# Patient Record
Sex: MALE | Race: NATIVE HAWAIIAN OR OTHER PACIFIC ISLANDER
[De-identification: names, ages, dates, MRNs, and addresses within clinical notes are randomized per-mention and may not be internally consistent; named-entity substitution may affect disease eponyms.]

---

## 2018-05-24 ENCOUNTER — HOSPITAL ENCOUNTER (INPATIENT)
Dept: HOSPITAL 80 - FED | Age: 33
LOS: 12 days | Discharge: HOME | DRG: 885 | End: 2018-06-05
Attending: PSYCHIATRY & NEUROLOGY | Admitting: PSYCHIATRY & NEUROLOGY
Payer: COMMERCIAL

## 2018-05-24 DIAGNOSIS — Z72.89: ICD-10-CM

## 2018-05-24 DIAGNOSIS — F17.220: ICD-10-CM

## 2018-05-24 DIAGNOSIS — S40.212A: ICD-10-CM
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
## 2018-05-24 DIAGNOSIS — S63.92XA: ICD-10-CM

## 2018-05-24 DIAGNOSIS — S00.81XA: ICD-10-CM

## 2018-05-24 DIAGNOSIS — F31.2: Primary | ICD-10-CM

## 2018-05-24 DIAGNOSIS — F12.20: ICD-10-CM

## 2018-05-24 LAB — PLATELET # BLD: 292 10^3/UL (ref 150–400)

## 2018-05-24 PROCEDURE — G0480 DRUG TEST DEF 1-7 CLASSES: HCPCS
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
## 2018-05-25 RX ADMIN — OLANZAPINE SCH MG: 10 TABLET, ORALLY DISINTEGRATING ORAL at 21:10

## 2018-05-25 NOTE — WOCRNPDOC
[facility]AR[facility] Advanced Assessment Note





- Skin Integrity Problem, Advanced Assess


  ** Left Shoulder Abrasion


Dressing Type: ABD Pad, Adaptic Touch, Other


Other Dressing Type: medipore tape


Dressing Description: Clean/Dry, Intact


Integumentary Issue Intervention: Visualized Under Dressing


Anusha Wound Tissue: Blanching


Wound Bed Color: Pink, Red


Wound Bed Constitution: Red/Pink - Non Granular Tissue


Site Measurement - Head-to-Toe Length X Width X Depth (cm): 5.5x7.2x0.1


Skin Integrity Problem Comment: Partial thickness wound. Wound bed is healthy 

pink and red. Hedii AVALOS provided education and reassurance about 

healing of wound. Patient was receptive to wound care suggestions to keep wound 

bed moist and covered to prevent scarring.





  ** Left Cheek Abrasion


Dressing Type: Open to Air


Exudate Amount: Scant


Exudate Color: Red


Exudate Characteristic(s): Dried, Serosanguinous


Anusha Wound Tissue: Blanching, Ecchymotic


Wound Bed Color: Purple, Red


Site Measurement - Head-to-Toe Length X Width X Depth (cm): 3.7x2.6x0.1


Skin Integrity Problem Comment: Partial thickness wounds to left cheek, the 

largest on cheekbone measures approximately 4x2.5. Spoke with CASSI Truong to 

have MD order bacitracin for facial wounds. Heidi AVALOS provided 

education about using bacitracin ointment to prevent scarring, and to use 

sunscreen to wound areas for 6 months to a year to prevent scarring from sun 

exposure. Wound care will sign off on this patient.

## 2018-05-26 RX ADMIN — OLANZAPINE SCH MG: 10 TABLET, ORALLY DISINTEGRATING ORAL at 20:42

## 2018-05-26 RX ADMIN — ACETAMINOPHEN PRN MG: 325 TABLET ORAL at 23:03
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
## 2018-05-27 RX ADMIN — ACETAMINOPHEN PRN MG: 325 TABLET ORAL at 11:58

## 2018-05-27 RX ADMIN — OLANZAPINE SCH MG: 10 TABLET, ORALLY DISINTEGRATING ORAL at 21:11

## 2018-05-27 RX ADMIN — ACETAMINOPHEN PRN MG: 325 TABLET ORAL at 21:11
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
## 2018-05-28 RX ADMIN — OLANZAPINE SCH MG: 10 TABLET, ORALLY DISINTEGRATING ORAL at 21:24
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
## 2018-05-29 RX ADMIN — DIVALPROEX SODIUM SCH MG: 500 TABLET, EXTENDED RELEASE ORAL at 20:42

## 2018-05-29 RX ADMIN — OLANZAPINE SCH MG: 10 TABLET, ORALLY DISINTEGRATING ORAL at 20:42

## 2018-05-29 NOTE — SOAPPROG
SOAP Progress Note


Assessment/Plan: 


Assessment:


























Plan:





05/29/18 18:47


Bipolar D/o:  Remains manic though improving.  Will continue Zyprexa and add 

VPA per Dr. Ahumada's plan.  I discussed with pt the possibility of longer term 

management with one agent, but needing both for acute stabilization.  He is 

accepting of this.  Will start VPA at 1000mg, monitor. 


Subjective: 





Pt seen, discussed with staff, Dr. Ahumada and pt's wife Debora.  Chart reviewed.  

He is pleasant and interactive during interview, though pressured and 

tangential at times.  Staff notes continued grandiosity, talking about his 

accomplishments at work and his very high achievement.  He minimizes 

significance of legal issues, stating he believes it will all be resolved.  

Wife sees this lack of insight at "being a con man."  She states this is not 

normal for him.  He is compliant with meds and therapies and agrees to start 

VPA tonight.  The risks, benefits and alternatives of this are reviewed with 

him.  He asks about possible "organ damage" and I discussed the potential risks 

of elevated transaminases and pancreatitis.  


Objective: 





 Vital Signs











Temp Pulse Resp BP Pulse Ox


 


 36.4 C   76   15   121/72 H  95 


 


 05/29/18 06:00  05/29/18 06:00  05/29/18 06:00  05/29/18 06:00  05/29/18 06:00








MSE:  Adequately groomed, animated, interactive.  Speech is pressured.  Affect 

is elevated, expansive.  Mood is "great."  TP is tangential.  TC reveals 

grandiose thoughts, poor reality testing.  Denies SI/HI/VI.





- Time Spent With Patient


Time Spent With Patient: 





25"





ICD10 Worksheet


Patient Problems: 


 Problems











Problem Status Onset


 


Abrasion of left shoulder, initial encounter Acute  


 


Bipolar affective, manic, severe w/ psych Acute  


 


Sprain of left hand Acute

## 2018-05-30 RX ADMIN — OLANZAPINE SCH MG: 10 TABLET, ORALLY DISINTEGRATING ORAL at 21:10

## 2018-05-30 RX ADMIN — DIVALPROEX SODIUM SCH MG: 500 TABLET, EXTENDED RELEASE ORAL at 21:10

## 2018-05-30 NOTE — SOAPPROG
SOAP Progress Note


Assessment/Plan: 


Assessment:


























Plan:





05/29/18 18:47


Bipolar D/o:  Remains manic though improving.  Will continue Zyprexa and add 

VPA per Dr. Ahumada's plan.  I discussed with pt the possibility of longer term 

management with one agent, but needing both for acute stabilization.  He is 

accepting of this.  Will start VPA at 1000mg, monitor. 


05/30/18 16:10


Bipolar D/o:  Continued gradual improvement.  Sleep is stabilizing which is a 

positive sign.  Compliant with meds.  No aggression.  Will CCM.


Subjective: 





Pt seen, discussed with staff.  Reports feeling "really good."  Remains 

hyperverbal, pressured, intrusive, tangential.  No irritability or aggression 

noted.  Compliant with meds and therapies.  Gets a bit off track in groups at 

times.  Discussed the importance of abstinence from cannabis after d/c and he 

remains unconvinced.  Tolerated first dose of VPA last night well.  States he 

slept "better than I have in months."  Discussed d/c and f/u plans.


Objective: 





 Vital Signs











Temp Pulse Resp BP Pulse Ox


 


 36.6 C   80   12   154/98 H  97 


 


 05/30/18 15:02  05/30/18 15:02  05/30/18 15:02  05/30/18 15:02  05/30/18 15:02














- Time Spent With Patient


Time Spent With Patient: 





25"





ICD10 Worksheet


Patient Problems: 


 Problems











Problem Status Onset


 


Abrasion of left shoulder, initial encounter Acute  


 


Bipolar affective, manic, severe w/ psych Acute  


 


Sprain of left hand Acute

## 2018-05-31 RX ADMIN — OLANZAPINE SCH MG: 10 TABLET, ORALLY DISINTEGRATING ORAL at 20:50

## 2018-05-31 RX ADMIN — DIVALPROEX SODIUM SCH MG: 500 TABLET, EXTENDED RELEASE ORAL at 20:50
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
## 2018-05-31 NOTE — SOAPPROG
SOAP Progress Note


Assessment/Plan: 


Assessment:


























Plan:





05/29/18 18:47


Bipolar D/o:  Remains manic though improving.  Will continue Zyprexa and add 

VPA per Dr. Ahumada's plan.  I discussed with pt the possibility of longer term 

management with one agent, but needing both for acute stabilization.  He is 

accepting of this.  Will start VPA at 1000mg, monitor. 


05/30/18 16:10


Bipolar D/o:  Continued gradual improvement.  Sleep is stabilizing which is a 

positive sign.  Compliant with meds.  No aggression.  Will CCM.


05/31/18 15:31


Kiana:  Remains manic, though improving with VPA.  CCM.


Subjective: 





Pt seen, discussed with staff.  States he slept well again last night.  

Perseverates on sleep, dreams and unconscious motivations.  Relates this to 

events preceding admission that "were like I was dreaming."  He remains 

pressured, tangential, though interactive and pleasant.  Compliant with all 

treatments and meds. Tolerating VPA well with no side effects.


Objective: 





 Vital Signs











Temp Pulse Resp BP Pulse Ox


 


 36.5 C   83   16   131/97 H  96 


 


 05/31/18 06:00  05/31/18 06:00  05/31/18 06:00  05/31/18 06:00  05/31/18 06:00








MSE:  Adequately groomed, coop.  Affect is blunted, stable.  Mood is "good."  

TP tangential.  TC reveals continued grandiose thoughts.  Minimizes the 

significance of his recent problems.





- Time Spent With Patient


Time Spent With Patient: 





25





ICD10 Worksheet


Patient Problems: 


 Problems











Problem Status Onset


 


Abrasion of left shoulder, initial encounter Acute  


 


Bipolar affective, manic, severe w/ psych Acute  


 


Sprain of left hand Acute

## 2018-06-01 RX ADMIN — DIVALPROEX SODIUM SCH MG: 500 TABLET, EXTENDED RELEASE ORAL at 17:56

## 2018-06-01 RX ADMIN — OLANZAPINE SCH MG: 10 TABLET, ORALLY DISINTEGRATING ORAL at 20:59

## 2018-06-01 NOTE — SOAPPROG
SOAP Progress Note


Assessment/Plan: 


Assessment:





Bipolar I disorder, manic with mood congruent psychotic features.  Patient is 

improving (see subjective/objective note).  Patient could benefit from 

continued inpatient hospitalization for crisis stabilization, safety, and 

medication evaluation.  








Plan:





Review psychotropic medication treatment informed consent and recommendations.  

After reviewing risk and benefits, patient agrees to continue medications.  No 

medication changes at this time as more time is needed to determine ongoing 

tolerability and efficacy.  Depakote level schedule for tomorrow AM.  Plan is 

to continue to observe patient for response and side effects from medications, 

and ongoing monitoring and evaluation.  Next steps are for patient to meet with 

care manager to plan a safe discharge plan and establish outpatient services 

for ongoing treatment.  Consider discharge on Monday or Tuesday of next week if 

patient is in stable condition, safe, and has a safe discharge plan.   





PSYCHOTROPIC MEDICATION TREATMENT INFORMED CONSENT and RECOMMENDATIONS: Review 

nature of condition, diagnosis, and prognosis.  Review nature and purpose of 

psychotropic medication treatment.  Review type of psychotropic medications 

being ordered.  Review risk and benefits of psychotropic medication treatment.  

Review probable length of time patient will need to take medications.  Review 

risk and benefits of not undergoing psychotropic medication treatment.  Review 

alternative treatments to psychotropic medications.  Review psychotropic 

medications contraindications, drug-drug interactions, side effects, and 

importance of reporting any side effects to a psychiatric provider or nurse 

during inpatient hospitalization, and upon discharge to patients psychiatric 

outpatient provider, primary care provider, or other health care professional.  

Review importance of asking a nurse, psychiatric provider, or primary care 

provider any questions or problems concerning the psychotropic medications.  

Verify patient understands the information that has been provided, and 

understands, accepts, and agrees to psychotropic medications.  


Review patients safety plan and importance of patient to report to staff while 

hospitalized if patient is ever a danger to self/others, or unable to care for 

self, and upon discharge, the importance for patient to contact [facility] or [facility], or go to the nearest emergency room, if patient is ever a 

danger to self/others, or unable to care for self.  Recommend that upon 

discharge patient establish medication management treatment with a psychiatric 

provider, establishes routine therapy appointments, and follow-up with primary 

care provider.  Verify patient understands and agrees to these recommendations.








06/01/18 13:26





Subjective: 


Following up with patient for evaluation of maikel and safety.  CC: Going 

goodlike the fact that I can sleep all through the night without waking up due 

to my mind racing.  Depakote doing well with slowing my thoughts down during 

the day.  I also like the combination of Zyprexa and Depakote.  Patient reports 

no symptoms of maikel, no A/V hallucinations, no SI, and expresses no 

psychiatric symptoms at this time.  Patient reports sleeping 8.5 hours, 

sleeping throughout the night, and feeling rested upon awakening.  Patient 

states he is tolerating current medications with no report of SEs.  





Objective: 





 Vital Signs











Temp Pulse Resp BP Pulse Ox


 


 36.5 C   67   12   121/74 H  94 


 


 05/31/18 06:00  06/01/18 06:00  06/01/18 06:00  06/01/18 06:00  06/01/18 06:00








Consulted with treatment team staff for update on patients progress in 

treatment.  Nursing reports patient is engaged in his treatment and progressing 

well.  Nursing reports patient is tolerating medications with no report of SEs, 

no SI reported, eating all his meals, and sleeping 9 hours. 





Symptoms noted: hypertalkative, grandiose; no other symptoms noted during 

interview.





No psychiatric complaints are expressed.  Patient is currently improving with 

current medications.  Patient shows slight treatment response as of today.  

Patient continues to exhibit symptoms of maikel--notably hypertalkative and 

grandiose.  Symptoms continue the same in frequency and intensity, and no 

improvement is noted.  





The patient presents casually dressed and with good hygiene, and looks stated 

age.  Patient is sitting, posture is upright, and position is relaxed.  Patient 

appears awake, alert, and responds appropriately and reasonably during 

interview.  Patient is engaged, relates well to interviewer, and emotional 

facial expression is appropriate to situation and changes appropriately with 

topic.  Patient is cooperative, makes comfortable eye contact, and movements 

are voluntary, deliberate, coordinated, and smooth and even with no 

inappropriate movements.  Patient makes laryngeal sounds effortlessly and 

shares conversation appropriately; pace of conversation is appropriate, and 

stream of talking is fluent; articulation is clear and understandable; word 

choice is effortless and appropriate for education level; completes sentences, 

occasionally pausing to think; rate is hypertalkative and volume are 

appropriate for interview and setting.  Patient reports mood as euthymic.  

Patients affect is stable with full variable range, congruent with mood, and 

appropriate to speech and circumstances.  Patient has linear and logical 

thinking, with no loose associations, tangential thought, thought blocking, 

concrete thinking, or any other signs of formal thought disorder.  Patient 

denies suicidal and homicidal ideation, and denies hallucinations and 

delusions.  Patient appears to be a reliable historian with sound judgement and 

good insight into current condition.  Patient has no apparent dysfunction in 

recent or remote memory noted, and no evidence of gross cognitive dysfunction 

noted at any point during the interview.











- Time Spent With Patient


Time Spent With Patient: 


30 minutes, met with patient individually.








- Pending Discharge


Pending Discharge Within 24 Hours: No


Pending Discharge Within 48 Hours: No





ICD10 Worksheet


Patient Problems: 


 Problems











Problem Status Onset


 


Bipolar affective, manic, severe w/ psych Acute  


 


Abrasion of left shoulder, initial encounter Acute  


 


Sprain of left hand Acute

## 2018-06-02 RX ADMIN — OLANZAPINE SCH MG: 10 TABLET, ORALLY DISINTEGRATING ORAL at 20:25

## 2018-06-02 RX ADMIN — DIVALPROEX SODIUM SCH MG: 500 TABLET, EXTENDED RELEASE ORAL at 20:24

## 2018-06-02 NOTE — SOAPPROG
SOAP Progress Note


Assessment/Plan: 


Assessment:





Per Jean Claude Andrews's note:


Assessment:





Bipolar I disorder, manic with mood congruent psychotic features.  Patient is 

improving (see subjective/objective note).  Patient could benefit from 

continued inpatient hospitalization for crisis stabilization, safety, and 

medication evaluation.  





06/02/18 14:07


1. VPA level this AM was 45.1


2. Will increase VPA to 1,500 mg QHS. 


3. Continue Zyprexa for mood and psychosis.


4. Still hyperverbal and some paranoia. 


Subjective: 


Met with patient, reviewed chart and d/w staff. Patient presents with pressured 

speech and racing thoughts. He has been sleeping well, 8-9 hrs most nights (8 

hrs last night). He became slightly irritable when complaining about how long 

he has been in the hospital. He says he will probably "be here another week or 

two," even though staff have reassured him that is not likely. He told RN, 

someone is "doing something strategic to keep me here," but didn't explain what 

he meant. He denies any SI/HI. 





Objective: 





 Vital Signs











Temp Pulse Resp BP Pulse Ox


 


 36.3 C   93   12   135/91 H  96 


 


 06/02/18 06:00  06/02/18 06:00  06/02/18 06:00  06/02/18 06:00  06/02/18 06:00








MSE: Affect: Euthymic, slightly irritable at times  TP: Tangential  TC: Denies 

any SI/HI, no hallucinations, slightly paranoid about being kept in hospital 

Insight/Judgment: Poor





- Time Spent With Patient


Time Spent With Patient: 


15"








- Pending Discharge


Pending Discharge Within 24 Hours: No


Pending Discharge Within 48 Hours: No





ICD10 Worksheet


Patient Problems: 


 Problems











Problem Status Onset


 


Abrasion of left shoulder, initial encounter Acute  


 


Bipolar affective, manic, severe w/ psych Acute  


 


Sprain of left hand Acute

## 2018-06-03 RX ADMIN — DIVALPROEX SODIUM SCH MG: 500 TABLET, EXTENDED RELEASE ORAL at 21:13

## 2018-06-03 RX ADMIN — OLANZAPINE SCH MG: 10 TABLET, ORALLY DISINTEGRATING ORAL at 21:12

## 2018-06-03 NOTE — SOAPPROG
SOAP Progress Note


Assessment/Plan: 


Assessment:





Per Jean Claude Andrews's note:


Assessment:





Bipolar I disorder, manic with mood congruent psychotic features.  Patient is 

improving (see subjective/objective note).  Patient could benefit from 

continued inpatient hospitalization for crisis stabilization, safety, and 

medication evaluation.  





06/02/18 14:07


1. VPA level this AM was 45.1


2. Will increase VPA to 1,500 mg QHS. 


3. Continue Zyprexa for mood and psychosis.


4. Still hyperverbal and some paranoia. 





06/03/18 14:07


1. Met with patient and his wife, Debora. MD explained that patient will need to f

/u with lab to have VPA level in 5-7 days after the dose increase. Wife reports 

patient has PCP through [facility] and could go to lab at [facility] to have blood 

drawn. Patient agrees. 


2. Wife would like patient to see prescriber in outpatient clinic here. Patient 

is willing to do this. 


3. Patient will continue to see Rory Winchester, but may switch to new therapist 

after discharge. 


4. Patient denies racing thoughts, but still has rapid speech and difficulty 

focusing for very long. Much improved since admission. Wife agrees. 


5. Likely to d/c on Monday or Tuesday. Patient understands need to have f/u 

appts and safety plan finalized prior to d/c. 


Subjective: 


Met with patient, reviewed chart, d/w staff. Patient is less hyperactive and 

hyperverbal today. MD met with patient and his wife. MD explained need to have 

VPA level re-checked after dose increase. Patient verbalized understanding and 

agreed. Wife would like patient to see prescriber and possibly therapist in 

outpatient clinic here. Patient denies any hallucinations, seems less paranoid 

today. Denies any SI/HI. 


Objective: 





 Vital Signs











Temp Pulse Resp BP Pulse Ox


 


 36.6 C   72   16   118/76   95 


 


 06/03/18 06:00  06/03/18 06:00  06/03/18 06:00  06/03/18 06:00  06/03/18 06:00








MSE: Affect: Euthymic  Mood: "Good"  TP: More organized and logical  TC: Denies 

SI/HI, no AH/VH   Insight/Judgment: Improved





- Time Spent With Patient


Time Spent With Patient: 


20"








- Pending Discharge


Pending Discharge Within 24 Hours: No


Pending Discharge Within 48 Hours: Yes


Pending Discharge Date: 06/05/18 (Possible d/c by Tuesday if f/u appts made)


Pending Discharge Time: 11:00





ICD10 Worksheet


Patient Problems: 


 Problems











Problem Status Onset


 


Abrasion of left shoulder, initial encounter Acute  


 


Bipolar affective, manic, severe w/ psych Acute  


 


Sprain of left hand Acute

## 2018-06-04 RX ADMIN — DIVALPROEX SODIUM SCH MG: 500 TABLET, EXTENDED RELEASE ORAL at 18:30

## 2018-06-04 RX ADMIN — OLANZAPINE SCH MG: 10 TABLET, ORALLY DISINTEGRATING ORAL at 20:47

## 2018-06-04 NOTE — SOAPPROG
SOAP Progress Note


Assessment/Plan: 


Assessment:


























Plan:





05/29/18 18:47


Bipolar D/o:  Remains manic though improving.  Will continue Zyprexa and add 

VPA per Dr. Ahumada's plan.  I discussed with pt the possibility of longer term 

management with one agent, but needing both for acute stabilization.  He is 

accepting of this.  Will start VPA at 1000mg, monitor. 


05/30/18 16:10


Bipolar D/o:  Continued gradual improvement.  Sleep is stabilizing which is a 

positive sign.  Compliant with meds.  No aggression.  Will CCM.


05/31/18 15:31


Kiana:  Remains manic, though improving with VPA.  [facility].


06/04/18 13:43


Kiana:  Much improved.  Will check VPA level in the morning and convene a 

family meeting for d/c planning.


Subjective: 





Pt seen, discussed with staff.  Reports feeling "a lot better.  I feel like I'm 

back to my normal self.  Everything seems like either a mushroom trip or a bad 

dream."  He did well over the weekend, though became upset on Saturday over 

"continuity of care" when he saw a different psychiatrist in [place]."  He 

states now that he was "just frustrated" over wanting to go home.  He was 

calmer yesterday and interacts appropriately with me today.  VPA increased on 6/ 2/18 to 1500mg.


Objective: 





 Vital Signs











Temp Pulse Resp BP Pulse Ox


 


 36.4 C   75   14   114/69   93 


 


 06/04/18 06:00  06/04/18 06:00  06/04/18 06:00  06/04/18 06:00  06/04/18 06:00








MSE:  Marginally groomed, coop.  Affect is euthymic, stable.  Mood is "a lot 

better."  TP is generally linear with occasional wanderings.  TC reveals no 

obvious grandiose or paranoid thoughts.





- Time Spent With Patient


Time Spent With Patient: 





25"





ICD10 Worksheet


Patient Problems: 


 Problems











Problem Status Onset


 


Abrasion of left shoulder, initial encounter Acute  


 


Bipolar affective, manic, severe w/ psych Acute  


 


Sprain of left hand Acute

## 2018-06-05 VITALS — DIASTOLIC BLOOD PRESSURE: 75 MMHG | SYSTOLIC BLOOD PRESSURE: 126 MMHG
